# Patient Record
Sex: MALE | Race: WHITE | NOT HISPANIC OR LATINO | Employment: OTHER | URBAN - METROPOLITAN AREA
[De-identification: names, ages, dates, MRNs, and addresses within clinical notes are randomized per-mention and may not be internally consistent; named-entity substitution may affect disease eponyms.]

---

## 2023-03-07 ENCOUNTER — DOCUMENTATION (OUTPATIENT)
Dept: PRIMARY CARE | Facility: CLINIC | Age: 63
End: 2023-03-07
Payer: MEDICARE

## 2023-03-09 ENCOUNTER — PATIENT OUTREACH (OUTPATIENT)
Dept: CARE COORDINATION | Facility: CLINIC | Age: 63
End: 2023-03-09
Payer: MEDICARE

## 2023-03-19 DIAGNOSIS — M62.838 MUSCLE SPASM: Primary | ICD-10-CM

## 2023-03-22 RX ORDER — TIZANIDINE 2 MG/1
TABLET ORAL
Qty: 180 TABLET | Refills: 0 | Status: SHIPPED | OUTPATIENT
Start: 2023-03-22 | End: 2023-05-19

## 2023-03-29 ENCOUNTER — PATIENT OUTREACH (OUTPATIENT)
Dept: PRIMARY CARE | Facility: CLINIC | Age: 63
End: 2023-03-29
Payer: MEDICARE

## 2023-03-29 NOTE — PROGRESS NOTES
TCM Follow up call,  Matthieu states he is doing very well, has followed up with ortho. He had no questions or concerns and appreciated the outreach.

## 2023-04-10 ENCOUNTER — DOCUMENTATION (OUTPATIENT)
Dept: PRIMARY CARE | Facility: CLINIC | Age: 63
End: 2023-04-10
Payer: MEDICARE

## 2023-04-22 DIAGNOSIS — R55 NEUROCARDIOGENIC PRE-SYNCOPE: Primary | ICD-10-CM

## 2023-04-24 RX ORDER — TOPIRAMATE 50 MG/1
TABLET, FILM COATED ORAL
Qty: 180 TABLET | Refills: 3 | Status: SHIPPED | OUTPATIENT
Start: 2023-04-24 | End: 2024-05-30

## 2023-05-18 DIAGNOSIS — M62.838 MUSCLE SPASM: ICD-10-CM

## 2023-05-19 RX ORDER — TIZANIDINE 2 MG/1
TABLET ORAL
Qty: 180 TABLET | Refills: 0 | Status: SHIPPED | OUTPATIENT
Start: 2023-05-19 | End: 2023-07-17

## 2023-07-05 DIAGNOSIS — I25.10 CORONARY ARTERY DISEASE, UNSPECIFIED VESSEL OR LESION TYPE, UNSPECIFIED WHETHER ANGINA PRESENT, UNSPECIFIED WHETHER NATIVE OR TRANSPLANTED HEART: ICD-10-CM

## 2023-07-06 RX ORDER — ATORVASTATIN CALCIUM 80 MG/1
80 TABLET, FILM COATED ORAL DAILY
Qty: 90 TABLET | Refills: 0 | Status: SHIPPED | OUTPATIENT
Start: 2023-07-06 | End: 2023-12-06

## 2023-07-14 DIAGNOSIS — M62.838 MUSCLE SPASM: ICD-10-CM

## 2023-07-17 RX ORDER — TIZANIDINE 2 MG/1
TABLET ORAL
Qty: 180 TABLET | Refills: 0 | Status: SHIPPED | OUTPATIENT
Start: 2023-07-17 | End: 2023-09-15

## 2023-09-13 DIAGNOSIS — M62.838 MUSCLE SPASM: ICD-10-CM

## 2023-09-15 RX ORDER — TIZANIDINE 2 MG/1
TABLET ORAL
Qty: 180 TABLET | Refills: 0 | Status: SHIPPED | OUTPATIENT
Start: 2023-09-15 | End: 2023-11-08

## 2023-10-23 DIAGNOSIS — R32 URINARY INCONTINENCE, UNSPECIFIED TYPE: ICD-10-CM

## 2023-10-23 DIAGNOSIS — R39.15 URINARY URGENCY: ICD-10-CM

## 2023-10-23 DIAGNOSIS — R35.0 URINARY FREQUENCY: ICD-10-CM

## 2023-10-23 RX ORDER — OXYBUTYNIN CHLORIDE 5 MG/1
5 TABLET ORAL 2 TIMES DAILY
Qty: 180 TABLET | Refills: 1 | Status: SHIPPED | OUTPATIENT
Start: 2023-10-23 | End: 2024-02-02

## 2023-11-01 ENCOUNTER — TELEPHONE (OUTPATIENT)
Dept: PRIMARY CARE | Facility: CLINIC | Age: 63
End: 2023-11-01
Payer: MEDICARE

## 2023-11-08 DIAGNOSIS — M62.838 MUSCLE SPASM: ICD-10-CM

## 2023-11-08 RX ORDER — TIZANIDINE 2 MG/1
TABLET ORAL
Qty: 180 TABLET | Refills: 0 | Status: SHIPPED | OUTPATIENT
Start: 2023-11-08 | End: 2023-12-04

## 2023-12-04 DIAGNOSIS — M62.838 MUSCLE SPASM: ICD-10-CM

## 2023-12-04 RX ORDER — TIZANIDINE 2 MG/1
TABLET ORAL
Qty: 60 TABLET | Refills: 0 | Status: SHIPPED | OUTPATIENT
Start: 2023-12-04

## 2023-12-06 DIAGNOSIS — I25.10 CORONARY ARTERY DISEASE, UNSPECIFIED VESSEL OR LESION TYPE, UNSPECIFIED WHETHER ANGINA PRESENT, UNSPECIFIED WHETHER NATIVE OR TRANSPLANTED HEART: ICD-10-CM

## 2023-12-06 RX ORDER — ATORVASTATIN CALCIUM 80 MG/1
80 TABLET, FILM COATED ORAL DAILY
Qty: 90 TABLET | Refills: 0 | Status: SHIPPED | OUTPATIENT
Start: 2023-12-06 | End: 2024-02-19

## 2024-01-04 ENCOUNTER — TELEPHONE (OUTPATIENT)
Dept: PRIMARY CARE | Facility: CLINIC | Age: 64
End: 2024-01-04
Payer: MEDICARE

## 2024-01-09 ENCOUNTER — APPOINTMENT (OUTPATIENT)
Dept: PRIMARY CARE | Facility: CLINIC | Age: 64
End: 2024-01-09
Payer: MEDICARE

## 2024-01-29 ENCOUNTER — PATIENT OUTREACH (OUTPATIENT)
Dept: PRIMARY CARE | Facility: CLINIC | Age: 64
End: 2024-01-29
Payer: MEDICARE

## 2024-01-29 NOTE — PROGRESS NOTES
Discharge Facility:[facility]  Discharge Diagnosis:Altered Mental status, Hypokalemia   Admission Date:1/7/24  Discharge Date: 1/13/24    PCP Appointment Date:2/2/24  Specialist Appointment Date: N/A  Hospital Encounter and Summary: Linked   See discharge assessment below for further details  Engagement  Call Start Time: 1525 (1/29/2024  3:24 PM)    Medications  Medications reviewed with patient/caregiver?: Yes (1/29/2024  3:24 PM)  Is the patient having any side effects they believe may be caused by any medication additions or changes?: No (1/29/2024  3:24 PM)  Does the patient have all medications ordered at discharge?: Yes (1/29/2024  3:24 PM)  Prescription Comments: see med list (1/29/2024  3:24 PM)  Is the patient taking all medications as directed (includes completed medication regime)?: Yes (1/29/2024  3:24 PM)  Medication Comments: see med lis tpotassium chloride CR (Klor-Con M20) 20 MEQ ER tablet    01/14/2024    calcium carbonate (Tums Ultra) 1000 MG chewable tablet   01/13/2024    QUEtiapine (SEROquel) 25 MG tablet    0 01/13/2024    mirabegron ER (Myrbetriq) 25 MG 24 hr tablet   Take 1 tablet (25 mg) by mouth daily. Do not crush, chew, or split.   0 01/13/2024    melatonin 10 MG tablet   .   0 01/13/2024    gabapentin (Neurontin) 100 MG capsule   Take 3 capsules (300 mg) by mouth daily AND 4 capsules (400 mg) Nightly. Do all this for 3 days. The two doses are given at 1400 (2 pm) and before bed..  0 01/13/2024 01/16/2024  droxidopa (Northera) 100 MG capsule   Take 5 capsules (500 mg) before each meal for a total of 1500 mg/24 hours.   0 01/13/2024    carbidopa-levodopa (Sinemet)  MG tablet  .   0 01/13/2024    amitriptyline (Elavil) 50 MG tablet (1/29/2024  3:24 PM)    Appointments  Does the patient have a primary care provider?: Yes (1/29/2024  3:24 PM)  Care Management Interventions: Verified appointment date/time/provider ([facility] 2/2/24) (1/29/2024  3:24 PM)  Has the patient kept scheduled appointments  due by today?: Yes (1/29/2024  3:24 PM)  Care Management Interventions: Advised patient to keep appointment (1/29/2024  3:24 PM)    Patient Teaching  Does the patient have access to their discharge instructions?: Yes (1/29/2024  3:24 PM)  Care Management Interventions: Reviewed instructions with patient (1/29/2024  3:24 PM)  What is the patient's perception of their health status since discharge?: Improving (1/29/2024  3:24 PM)  Is the patient/caregiver able to teach back the hierarchy of who to call/visit for symptoms/problems? PCP, Specialist, Home Health nurse, Urgent Care, ED, 911: Yes (1/29/2024  3:24 PM)      Sarah Ness LPN

## 2024-01-31 ENCOUNTER — PATIENT OUTREACH (OUTPATIENT)
Dept: PRIMARY CARE | Facility: CLINIC | Age: 64
End: 2024-01-31
Payer: MEDICARE

## 2024-02-01 ENCOUNTER — TELEPHONE (OUTPATIENT)
Dept: PRIMARY CARE | Facility: CLINIC | Age: 64
End: 2024-02-01
Payer: MEDICARE

## 2024-02-01 DIAGNOSIS — R26.2 DIFFICULTY WALKING: ICD-10-CM

## 2024-02-01 DIAGNOSIS — R53.1 WEAKNESS GENERALIZED: ICD-10-CM

## 2024-02-01 DIAGNOSIS — R29.6 FALLING: Primary | ICD-10-CM

## 2024-02-01 PROBLEM — R55 NEUROCARDIOGENIC PRE-SYNCOPE: Status: ACTIVE | Noted: 2024-02-01

## 2024-02-01 PROBLEM — Z98.61 HISTORY OF PTCA: Status: ACTIVE | Noted: 2017-01-09

## 2024-02-01 PROBLEM — E27.40 ADRENAL CORTICAL INSUFFICIENCY (MULTI): Status: ACTIVE | Noted: 2023-03-28

## 2024-02-01 PROBLEM — N40.0 BENIGN PROSTATIC HYPERPLASIA: Status: ACTIVE | Noted: 2023-01-12

## 2024-02-01 PROBLEM — E78.5 HYPERLIPIDEMIA: Status: ACTIVE | Noted: 2018-07-14

## 2024-02-01 PROBLEM — G31.84 MCI (MILD COGNITIVE IMPAIRMENT): Status: ACTIVE | Noted: 2020-07-14

## 2024-02-01 PROBLEM — I25.10 CAD (CORONARY ARTERY DISEASE): Status: ACTIVE | Noted: 2024-02-01

## 2024-02-01 PROBLEM — I74.3: Status: ACTIVE | Noted: 2021-02-25

## 2024-02-01 PROBLEM — F32.A DEPRESSION: Status: ACTIVE | Noted: 2023-03-28

## 2024-02-01 PROBLEM — M48.062 SPINAL STENOSIS OF LUMBAR REGION WITH NEUROGENIC CLAUDICATION: Status: ACTIVE | Noted: 2024-02-01

## 2024-02-01 PROBLEM — G62.1 ALCOHOLIC POLYNEUROPATHY (MULTI): Status: ACTIVE | Noted: 2023-03-28

## 2024-02-02 ENCOUNTER — TELEMEDICINE (OUTPATIENT)
Dept: PRIMARY CARE | Facility: CLINIC | Age: 64
End: 2024-02-02
Payer: MEDICARE

## 2024-02-02 DIAGNOSIS — G90.3 NEUROGENIC ORTHOSTATIC HYPOTENSION (MULTI): ICD-10-CM

## 2024-02-02 DIAGNOSIS — F32.A ANXIETY AND DEPRESSION: ICD-10-CM

## 2024-02-02 DIAGNOSIS — F33.41 MAJOR DEPRESSIVE DISORDER, RECURRENT, IN PARTIAL REMISSION (CMS-HCC): ICD-10-CM

## 2024-02-02 DIAGNOSIS — N32.81 OVERACTIVE BLADDER: ICD-10-CM

## 2024-02-02 DIAGNOSIS — F33.1 MAJOR DEPRESSIVE DISORDER, RECURRENT, MODERATE (MULTI): ICD-10-CM

## 2024-02-02 DIAGNOSIS — Z11.59 NEED FOR HEPATITIS C SCREENING TEST: ICD-10-CM

## 2024-02-02 DIAGNOSIS — E87.6 HYPOKALEMIA: ICD-10-CM

## 2024-02-02 DIAGNOSIS — K21.9 GASTROESOPHAGEAL REFLUX DISEASE WITHOUT ESOPHAGITIS: ICD-10-CM

## 2024-02-02 DIAGNOSIS — I74.3: ICD-10-CM

## 2024-02-02 DIAGNOSIS — R74.8 ELEVATED LIVER ENZYMES: Primary | ICD-10-CM

## 2024-02-02 DIAGNOSIS — R26.81 GAIT INSTABILITY: ICD-10-CM

## 2024-02-02 DIAGNOSIS — F41.9 ANXIETY AND DEPRESSION: ICD-10-CM

## 2024-02-02 DIAGNOSIS — G62.1 ALCOHOLIC POLYNEUROPATHY (MULTI): ICD-10-CM

## 2024-02-02 DIAGNOSIS — M87.00 IDIOPATHIC ASEPTIC NECROSIS OF UNSPECIFIED BONE (MULTI): ICD-10-CM

## 2024-02-02 DIAGNOSIS — E27.40: ICD-10-CM

## 2024-02-02 DIAGNOSIS — G31.84 MCI (MILD COGNITIVE IMPAIRMENT): ICD-10-CM

## 2024-02-02 PROCEDURE — 99214 OFFICE O/P EST MOD 30 MIN: CPT | Performed by: INTERNAL MEDICINE

## 2024-02-02 RX ORDER — CLONAZEPAM 1 MG/1
3 TABLET ORAL NIGHTLY
COMMUNITY
Start: 2023-10-19

## 2024-02-02 RX ORDER — OMEPRAZOLE 20 MG/1
20 TABLET, DELAYED RELEASE ORAL
Qty: 30 TABLET | Refills: 11 | COMMUNITY
Start: 2024-02-02 | End: 2024-02-06 | Stop reason: SDUPTHER

## 2024-02-02 RX ORDER — AMITRIPTYLINE HYDROCHLORIDE 50 MG/1
50 TABLET, FILM COATED ORAL NIGHTLY
Qty: 30 TABLET | Refills: 11 | COMMUNITY
Start: 2024-02-02 | End: 2024-02-09 | Stop reason: SDUPTHER

## 2024-02-02 RX ORDER — GABAPENTIN 300 MG/1
300 CAPSULE ORAL 2 TIMES DAILY
COMMUNITY

## 2024-02-02 RX ORDER — SERTRALINE HYDROCHLORIDE 100 MG/1
100 TABLET, FILM COATED ORAL DAILY
Qty: 30 TABLET | Refills: 5 | COMMUNITY
Start: 2024-02-02

## 2024-02-02 RX ORDER — RIVASTIGMINE 9.5 MG/24H
1 PATCH, EXTENDED RELEASE TRANSDERMAL DAILY
Qty: 30 PATCH | Refills: 11 | COMMUNITY
Start: 2024-02-02

## 2024-02-02 RX ORDER — TROSPIUM CHLORIDE 20 MG/1
20 TABLET, FILM COATED ORAL 2 TIMES DAILY
Qty: 60 TABLET | Refills: 11 | COMMUNITY
Start: 2024-02-02 | End: 2024-02-09 | Stop reason: SDUPTHER

## 2024-02-02 RX ORDER — DROXIDOPA 100 MG/1
500 CAPSULE ORAL 3 TIMES DAILY
COMMUNITY
Start: 2024-01-13

## 2024-02-02 RX ORDER — CARBIDOPA AND LEVODOPA 50; 200 MG/1; MG/1
1 TABLET, EXTENDED RELEASE ORAL NIGHTLY
COMMUNITY
Start: 2023-09-27

## 2024-02-02 RX ORDER — FLUDROCORTISONE ACETATE 0.1 MG/1
0.1 TABLET ORAL DAILY
COMMUNITY
Start: 2022-09-13

## 2024-02-02 RX ORDER — POTASSIUM CHLORIDE 20 MEQ/1
20 TABLET, EXTENDED RELEASE ORAL DAILY
Qty: 30 TABLET | Refills: 11 | COMMUNITY
Start: 2024-02-02 | End: 2024-02-09 | Stop reason: SDUPTHER

## 2024-02-02 RX ORDER — FERROUS SULFATE 325(65) MG
325 TABLET ORAL
COMMUNITY

## 2024-02-02 NOTE — PROGRESS NOTES
Subjective   Patient ID: Matthieu Birch is a 63 y.o. male who presents for Hospital Follow-up (Video visit for hospital visit ).    HPI   Hospital discharge follow up, improving gradually. Requesting disability placard.    Recovering from an acute COVID infection. Home health care is scheduled for the near future assistance.      Assessment/Plan   Diagnoses and all orders for this visit:  Elevated liver enzymes  -     Hepatic function panel; Future  Need for hepatitis C screening test  -     Hepatitis C Antibody; Future  MCI (mild cognitive impairment)  Adrenal cortical insufficiency (CMS/HCC)  Hypokalemia  Anxiety and depression  Gastroesophageal reflux disease without esophagitis  Overactive bladder  Gait instability  -     [facility] records from 1/29-1/31 reviewed and discussed with the patient and his wife.

## 2024-02-05 DIAGNOSIS — F32.A ANXIETY AND DEPRESSION: ICD-10-CM

## 2024-02-05 DIAGNOSIS — N32.81 OVERACTIVE BLADDER: ICD-10-CM

## 2024-02-05 DIAGNOSIS — F41.9 ANXIETY AND DEPRESSION: ICD-10-CM

## 2024-02-05 DIAGNOSIS — K21.9 GASTROESOPHAGEAL REFLUX DISEASE WITHOUT ESOPHAGITIS: ICD-10-CM

## 2024-02-06 DIAGNOSIS — K21.9 GASTROESOPHAGEAL REFLUX DISEASE WITHOUT ESOPHAGITIS: ICD-10-CM

## 2024-02-06 RX ORDER — OMEPRAZOLE 20 MG/1
20 CAPSULE, DELAYED RELEASE ORAL EVERY MORNING
Refills: 0 | OUTPATIENT
Start: 2024-02-06

## 2024-02-06 RX ORDER — TROSPIUM CHLORIDE 20 MG/1
20 TABLET, FILM COATED ORAL 2 TIMES DAILY
Qty: 180 TABLET | Refills: 0 | OUTPATIENT
Start: 2024-02-06

## 2024-02-06 RX ORDER — AMITRIPTYLINE HYDROCHLORIDE 50 MG/1
50 TABLET, FILM COATED ORAL NIGHTLY
Qty: 90 TABLET | Refills: 0 | OUTPATIENT
Start: 2024-02-06

## 2024-02-06 RX ORDER — OMEPRAZOLE 20 MG/1
20 TABLET, DELAYED RELEASE ORAL
Qty: 30 TABLET | Refills: 11 | Status: SHIPPED | OUTPATIENT
Start: 2024-02-06 | End: 2024-02-09 | Stop reason: SDUPTHER

## 2024-02-09 DIAGNOSIS — K21.9 GASTROESOPHAGEAL REFLUX DISEASE WITHOUT ESOPHAGITIS: ICD-10-CM

## 2024-02-09 DIAGNOSIS — N32.81 OVERACTIVE BLADDER: ICD-10-CM

## 2024-02-09 DIAGNOSIS — E87.6 HYPOKALEMIA: ICD-10-CM

## 2024-02-09 DIAGNOSIS — F41.9 ANXIETY AND DEPRESSION: ICD-10-CM

## 2024-02-09 DIAGNOSIS — F32.A ANXIETY AND DEPRESSION: ICD-10-CM

## 2024-02-09 RX ORDER — AMITRIPTYLINE HYDROCHLORIDE 50 MG/1
50 TABLET, FILM COATED ORAL NIGHTLY
Qty: 90 TABLET | Refills: 3 | Status: SHIPPED | OUTPATIENT
Start: 2024-02-09

## 2024-02-09 RX ORDER — TROSPIUM CHLORIDE 20 MG/1
20 TABLET, FILM COATED ORAL 2 TIMES DAILY
Qty: 180 TABLET | Refills: 0 | Status: SHIPPED | OUTPATIENT
Start: 2024-02-09 | End: 2024-02-19

## 2024-02-09 RX ORDER — POTASSIUM CHLORIDE 20 MEQ/1
20 TABLET, EXTENDED RELEASE ORAL DAILY
Qty: 90 TABLET | Refills: 3 | Status: SHIPPED | OUTPATIENT
Start: 2024-02-09

## 2024-02-09 RX ORDER — TROSPIUM CHLORIDE 20 MG/1
20 TABLET, FILM COATED ORAL 2 TIMES DAILY
Qty: 60 TABLET | Refills: 5 | Status: SHIPPED | OUTPATIENT
Start: 2024-02-09 | End: 2024-02-09 | Stop reason: SDUPTHER

## 2024-02-09 RX ORDER — OMEPRAZOLE 20 MG/1
20 TABLET, DELAYED RELEASE ORAL
Qty: 90 TABLET | Refills: 3 | Status: SHIPPED | OUTPATIENT
Start: 2024-02-09

## 2024-02-14 ENCOUNTER — APPOINTMENT (OUTPATIENT)
Dept: UROLOGY | Facility: CLINIC | Age: 64
End: 2024-02-14
Payer: MEDICARE

## 2024-02-16 DIAGNOSIS — N32.81 OVERACTIVE BLADDER: ICD-10-CM

## 2024-02-18 DIAGNOSIS — I25.10 CORONARY ARTERY DISEASE, UNSPECIFIED VESSEL OR LESION TYPE, UNSPECIFIED WHETHER ANGINA PRESENT, UNSPECIFIED WHETHER NATIVE OR TRANSPLANTED HEART: ICD-10-CM

## 2024-02-19 RX ORDER — TROSPIUM CHLORIDE 20 MG/1
20 TABLET, FILM COATED ORAL 2 TIMES DAILY
Qty: 180 TABLET | Refills: 0 | Status: SHIPPED | OUTPATIENT
Start: 2024-02-19

## 2024-02-19 RX ORDER — ATORVASTATIN CALCIUM 80 MG/1
80 TABLET, FILM COATED ORAL DAILY
Qty: 90 TABLET | Refills: 0 | Status: SHIPPED | OUTPATIENT
Start: 2024-02-19 | End: 2024-05-02

## 2024-02-21 ENCOUNTER — PATIENT OUTREACH (OUTPATIENT)
Dept: PRIMARY CARE | Facility: CLINIC | Age: 64
End: 2024-02-21
Payer: MEDICARE

## 2024-02-27 ENCOUNTER — APPOINTMENT (OUTPATIENT)
Dept: UROLOGY | Facility: CLINIC | Age: 64
End: 2024-02-27
Payer: MEDICARE

## 2024-03-27 ENCOUNTER — APPOINTMENT (OUTPATIENT)
Dept: UROLOGY | Facility: CLINIC | Age: 64
End: 2024-03-27
Payer: MEDICARE

## 2024-03-28 ENCOUNTER — PATIENT OUTREACH (OUTPATIENT)
Dept: PRIMARY CARE | Facility: CLINIC | Age: 64
End: 2024-03-28
Payer: MEDICARE

## 2024-04-29 ENCOUNTER — PATIENT OUTREACH (OUTPATIENT)
Dept: PRIMARY CARE | Facility: CLINIC | Age: 64
End: 2024-04-29
Payer: MEDICARE

## 2024-04-29 NOTE — PROGRESS NOTES
Final call.  CM called and spoke with pt to address any final questions or concerns regarding hospitalization.  Pt reports doing well and has no concerns at this time.  Pt given my contact info  in the event questions should arise.

## 2024-05-02 DIAGNOSIS — I25.10 CORONARY ARTERY DISEASE, UNSPECIFIED VESSEL OR LESION TYPE, UNSPECIFIED WHETHER ANGINA PRESENT, UNSPECIFIED WHETHER NATIVE OR TRANSPLANTED HEART: ICD-10-CM

## 2024-05-02 RX ORDER — ATORVASTATIN CALCIUM 80 MG/1
80 TABLET, FILM COATED ORAL DAILY
Qty: 90 TABLET | Refills: 0 | Status: SHIPPED | OUTPATIENT
Start: 2024-05-02

## 2024-05-06 DIAGNOSIS — N32.81 OVERACTIVE BLADDER: Primary | ICD-10-CM

## 2024-05-06 RX ORDER — OXYBUTYNIN CHLORIDE 5 MG/1
5 TABLET ORAL 2 TIMES DAILY
Qty: 180 TABLET | Refills: 0 | Status: SHIPPED | OUTPATIENT
Start: 2024-05-06

## 2024-05-07 ENCOUNTER — APPOINTMENT (OUTPATIENT)
Dept: UROLOGY | Facility: CLINIC | Age: 64
End: 2024-05-07
Payer: MEDICARE

## 2024-05-27 DIAGNOSIS — R55 NEUROCARDIOGENIC PRE-SYNCOPE: ICD-10-CM

## 2024-05-29 ENCOUNTER — APPOINTMENT (OUTPATIENT)
Dept: UROLOGY | Facility: CLINIC | Age: 64
End: 2024-05-29
Payer: MEDICARE

## 2024-05-30 RX ORDER — TOPIRAMATE 50 MG/1
50 TABLET, FILM COATED ORAL 2 TIMES DAILY
Qty: 180 TABLET | Refills: 0 | Status: SHIPPED | OUTPATIENT
Start: 2024-05-30

## 2024-06-15 DIAGNOSIS — R55 NEUROCARDIOGENIC PRE-SYNCOPE: ICD-10-CM

## 2024-06-17 DIAGNOSIS — N32.81 OVERACTIVE BLADDER: ICD-10-CM

## 2024-06-17 RX ORDER — OXYBUTYNIN CHLORIDE 5 MG/1
5 TABLET ORAL 3 TIMES DAILY
Qty: 90 TABLET | Refills: 0 | Status: SHIPPED | OUTPATIENT
Start: 2024-06-17

## 2024-06-17 RX ORDER — TOPIRAMATE 50 MG/1
50 TABLET, FILM COATED ORAL 2 TIMES DAILY
Qty: 180 TABLET | Refills: 0 | Status: SHIPPED | OUTPATIENT
Start: 2024-06-17

## 2024-06-25 ENCOUNTER — APPOINTMENT (OUTPATIENT)
Dept: UROLOGY | Facility: CLINIC | Age: 64
End: 2024-06-25
Payer: MEDICARE

## 2024-07-15 DIAGNOSIS — I25.10 CORONARY ARTERY DISEASE, UNSPECIFIED VESSEL OR LESION TYPE, UNSPECIFIED WHETHER ANGINA PRESENT, UNSPECIFIED WHETHER NATIVE OR TRANSPLANTED HEART: ICD-10-CM

## 2024-07-17 ENCOUNTER — APPOINTMENT (OUTPATIENT)
Dept: UROLOGY | Facility: CLINIC | Age: 64
End: 2024-07-17
Payer: MEDICARE

## 2024-07-17 VITALS
HEIGHT: 75 IN | HEART RATE: 73 BPM | DIASTOLIC BLOOD PRESSURE: 89 MMHG | SYSTOLIC BLOOD PRESSURE: 150 MMHG | WEIGHT: 204 LBS | BODY MASS INDEX: 25.36 KG/M2

## 2024-07-17 DIAGNOSIS — R35.0 BENIGN PROSTATIC HYPERPLASIA WITH URINARY FREQUENCY: Primary | ICD-10-CM

## 2024-07-17 DIAGNOSIS — N40.1 BENIGN PROSTATIC HYPERPLASIA WITH URINARY FREQUENCY: Primary | ICD-10-CM

## 2024-07-17 DIAGNOSIS — R32 URINARY INCONTINENCE, UNSPECIFIED TYPE: ICD-10-CM

## 2024-07-17 PROBLEM — G45.9 TIA (TRANSIENT ISCHEMIC ATTACK): Status: ACTIVE | Noted: 2023-01-12

## 2024-07-17 PROBLEM — N39.0 UTI (URINARY TRACT INFECTION): Status: ACTIVE | Noted: 2024-07-17

## 2024-07-17 PROBLEM — R39.15 URINARY URGENCY: Status: ACTIVE | Noted: 2024-07-17

## 2024-07-17 LAB
POC APPEARANCE, URINE: ABNORMAL
POC BILIRUBIN, URINE: NEGATIVE
POC BLOOD, URINE: NEGATIVE
POC COLOR, URINE: YELLOW
POC GLUCOSE, URINE: NEGATIVE MG/DL
POC KETONES, URINE: NEGATIVE MG/DL
POC LEUKOCYTES, URINE: NEGATIVE
POC NITRITE,URINE: NEGATIVE
POC PH, URINE: 7.5 PH
POC PROTEIN, URINE: NEGATIVE MG/DL
POC SPECIFIC GRAVITY, URINE: 1.02
POC UROBILINOGEN, URINE: 0.2 EU/DL

## 2024-07-17 PROCEDURE — 99204 OFFICE O/P NEW MOD 45 MIN: CPT | Performed by: NURSE PRACTITIONER

## 2024-07-17 PROCEDURE — 51798 US URINE CAPACITY MEASURE: CPT | Performed by: NURSE PRACTITIONER

## 2024-07-17 PROCEDURE — 81003 URINALYSIS AUTO W/O SCOPE: CPT | Performed by: NURSE PRACTITIONER

## 2024-07-17 PROCEDURE — 3008F BODY MASS INDEX DOCD: CPT | Performed by: NURSE PRACTITIONER

## 2024-07-17 RX ORDER — TAMSULOSIN HYDROCHLORIDE 0.4 MG/1
0.4 CAPSULE ORAL DAILY
Qty: 30 CAPSULE | Refills: 2 | Status: SHIPPED | OUTPATIENT
Start: 2024-07-17 | End: 2024-10-15

## 2024-07-17 NOTE — PROGRESS NOTES
UROLOGIC INITIAL EVALUATION     PROBLEM LIST:  1. Benign prostatic hyperplasia with urinary frequency  tamsulosin (Flomax) 0.4 mg 24 hr capsule      2. Urinary incontinence, unspecified type  Post-Void Residual    POCT UA Automated manually resulted           HISTORY OF PRESENT ILLNESS:   Matthieu Birch is a 63 y.o. with CAD, BPH, Parkinson's, spinal stenosis  Presents today for evaluation and management of urinary urgency  Seen accompanied by spouse, Marisa  Reports urgency, frequency despite oxybutynin  Worst when going from lying to sitting/standing  Issues began following a series of strokes about 5 years ago  Sometimes voiding as often as q 30 minutes  Can postpone voiding as long as he doesn't change position  Notes normal volume when voiding  Weak stream at first then normal  Had UTI then broke his hip last fall, symptoms worse since then   Also had incontinence at the time, resolved     PSH includes multiple craniotomies, multiple back/neck surgeries [id number], spinal cord stimulator placed 2023    PAST MEDICAL HISTORY:  Past Medical History:   Diagnosis Date    Encounter for screening for malignant neoplasm of prostate 02/01/2019    Screening PSA (prostate specific antigen)    Epidural hemorrhage with loss of consciousness status unknown, initial encounter (Multi) 08/20/2018    Epidural hematoma    Old myocardial infarction     History of myocardial infarction    Personal history of other diseases of male genital organs 01/04/2021    History of benign prostatic hyperplasia    Personal history of other diseases of the circulatory system     History of cardiac disorder    Personal history of other diseases of the circulatory system 06/13/2018    History of subdural hematoma    Personal history of other diseases of the circulatory system     History of subdural hematoma    Personal history of other diseases of the circulatory system     History of cardiovascular disorder    Personal history of other  diseases of the musculoskeletal system and connective tissue     History of arthritis    Personal history of transient ischemic attack (TIA), and cerebral infarction without residual deficits     History of cerebrovascular accident    Traumatic subdural hemorrhage with loss of consciousness status unknown, initial encounter (Multi) 05/18/2018    Subdural hematoma, post-traumatic       PAST SURGICAL HISTORY:  Past Surgical History:   Procedure Laterality Date    ANKLE SURGERY  06/18/2018    Ankle Surgery    BACK SURGERY  05/05/2022    Back Surgery    MR HEAD ANGIO WO IV CONTRAST  1/28/2015    MR HEAD ANGIO WO IV CONTRAST 1/28/2015 [facility]    MR NECK ANGIO WO IV CONTRAST  1/28/2015    MR NECK ANGIO WO IV CONTRAST 1/28/2015 [facility]    NECK SURGERY  07/11/2017    Neck Surgery    OTHER SURGICAL HISTORY  03/26/2018    Craniotomy (Therapeutic)        ALLERGIES:   Allergies   Allergen Reactions    Oxycodone Confusion, Hallucinations and Psychosis    Oxycodone-Acetaminophen Hallucinations        MEDICATIONS:   Current Outpatient Medications on File Prior to Visit   Medication Sig Dispense Refill    amitriptyline (Elavil) 50 mg tablet Take 1 tablet (50 mg) by mouth once daily at bedtime. 90 tablet 3    aspirin 81 mg capsule Take 81 mg by mouth once every 24 hours.      atorvastatin (Lipitor) 80 mg tablet Take 1 tablet (80 mg) by mouth once daily. ---DUE FOR APPT AND LABS 90 tablet 0    carbidopa-levodopa (Sinemet CR)  mg ER tablet Take 1 tablet by mouth once daily at bedtime.      clonazePAM (KlonoPIN) 1 mg tablet Take 3 tablets (3 mg) by mouth once daily at bedtime.      droxidopa (Northera) 100 mg capsule Take 5 capsules (500 mg) by mouth 3 times a day.      ferrous sulfate, 325 mg ferrous sulfate, tablet Take 1 tablet by mouth once daily.      fludrocortisone (Florinef) 0.1 mg tablet Take 1 tablet (0.1 mg) by mouth once daily.      gabapentin (Neurontin) 300 mg capsule Take 1 capsule (300  mg) by mouth 2 times a day.      omeprazole OTC (PriLOSEC OTC) 20 mg EC tablet Take 1 tablet (20 mg) by mouth once daily in the morning. Take before meals. Do not crush, chew, or split. 90 tablet 3    oxybutynin (Ditropan) 5 mg tablet Take 1 tablet (5 mg) by mouth 3 times a day. 90 tablet 0    potassium chloride CR (Klor-Con M20) 20 mEq ER tablet Take 1 tablet (20 mEq) by mouth once daily. Do not crush or chew. 90 tablet 3    rivastigmine (Exelon Patch) 9.5 mg/24 hour Place 1 patch on the skin once daily. 30 patch 11    sertraline (Zoloft) 100 mg tablet Take 1 tablet (100 mg) by mouth once daily. 30 tablet 5    tiZANidine (Zanaflex) 2 mg tablet TAKE 1 TO 2 TABLETS EVERY 8 HOURS AS NEEDED 60 tablet 0    topiramate (Topamax) 50 mg tablet Take 1 tablet (50 mg) by mouth 2 times a day. 180 tablet 0    trospium (Sanctura) 20 mg tablet TAKE 1 TABLET TWICE DAILY 180 tablet 0     No current facility-administered medications on file prior to visit.        SOCIAL HISTORY:  Patient  reports that he has never smoked. He has never used smokeless tobacco.   Social History     Socioeconomic History    Marital status:      Spouse name: Not on file    Number of children: Not on file    Years of education: Not on file    Highest education level: Not on file   Occupational History    Not on file   Tobacco Use    Smoking status: Never    Smokeless tobacco: Never   Substance and Sexual Activity    Alcohol use: Not on file    Drug use: Not on file    Sexual activity: Not on file   Other Topics Concern    Not on file   Social History Narrative    Not on file     Social Determinants of Health     Financial Resource Strain: Not on file   Food Insecurity: Not on file   Transportation Needs: No Transportation Needs (1/8/2024)    Received from [facility] - [facility]     Lack of Transportation (Medical): No     Lack of Transportation (Non-Medical): No   Physical Activity: Not on file   Stress: Not on file    Social Connections: Not on file   Intimate Partner Violence: Not on file   Housing Stability: Low Risk  (1/8/2024)    [facility] [employer] [employer]    Housing Stability Vital Sign     Unable to Pay for Housing in the Last Year: No     Number of Places Lived in the Last Year: 1     Unstable Housing in the Last Year: No       FAMILY HISTORY:  No family history on file.    REVIEW OF SYSTEMS:  All systems reviewed, pertinent negatives as noted in HPI.     PHYSICAL EXAM:  Visit Vitals  /89   Pulse 73     Constitutional: Well-developed and well-nourished. No distress.    Head: Normocephalic and atraumatic.    Neck: Normal range of motion.     Pulmonary/Chest: Effort normal. No respiratory distress.   Abdominal: Non-distended.  : See below.  Integumentary: No rash or lesions visualized.  Musculoskeletal: Normal range of motion.    Neurological: Alert and oriented.  Psychiatric: Normal mood and affect. Thought content normal.      LABORATORY REVIEW:   Lab Results   Component Value Date    BUN 20 02/17/2023    CREATININE 1.27 02/17/2023     02/17/2023    K 4.0 02/17/2023     (H) 02/17/2023    CO2 29 02/17/2023    OSMOLALITY 325 (H) 03/09/2018    CALCIUM 8.5 (L) 02/17/2023      Lab Results   Component Value Date    WBC 4.6 02/17/2023    RBC 4.26 (L) 02/17/2023    HGB 13.2 (L) 02/17/2023    HCT 42.2 02/17/2023    MCV 99 02/17/2023    MCHC 31.3 (L) 02/17/2023    RDW 13.2 02/17/2023     02/17/2023        Lab Results   Component Value Date    PSA 0.19 08/26/2022        Urine dipstick shows negative for all components.        Assessment:      1. Benign prostatic hyperplasia with urinary frequency  tamsulosin (Flomax) 0.4 mg 24 hr capsule      2. Urinary incontinence, unspecified type  Post-Void Residual    POCT UA Automated manually resulted          Matthieu Birch is a 63 y.o. with BPH and associated urgency, frequency  Significant urinary retention on bladder scan,   IPSS 13, BETH  16  UA negative    Discussed natural hx of BPH and approach to management, including medication & surgical intervention  Reviewed options for management of retention, including CIC  Suspect degree of chronic bladder distention, possibly related in part to stroke, spinal issues, medication     Plan:   Stop oxybutynin  Start tamsulosin, reviewed potential adverse effects  RTC in 4 weeks for reassessment with PVR

## 2024-07-21 RX ORDER — ATORVASTATIN CALCIUM 80 MG/1
80 TABLET, FILM COATED ORAL DAILY
Qty: 30 TABLET | Refills: 0 | Status: SHIPPED | OUTPATIENT
Start: 2024-07-21

## 2024-08-12 DIAGNOSIS — I25.10 CORONARY ARTERY DISEASE, UNSPECIFIED VESSEL OR LESION TYPE, UNSPECIFIED WHETHER ANGINA PRESENT, UNSPECIFIED WHETHER NATIVE OR TRANSPLANTED HEART: ICD-10-CM

## 2024-08-12 RX ORDER — ATORVASTATIN CALCIUM 80 MG/1
80 TABLET, FILM COATED ORAL DAILY
Qty: 30 TABLET | Refills: 0 | Status: SHIPPED | OUTPATIENT
Start: 2024-08-12

## 2024-08-20 ENCOUNTER — APPOINTMENT (OUTPATIENT)
Dept: UROLOGY | Facility: CLINIC | Age: 64
End: 2024-08-20
Payer: MEDICARE

## 2024-08-20 VITALS
SYSTOLIC BLOOD PRESSURE: 115 MMHG | DIASTOLIC BLOOD PRESSURE: 74 MMHG | HEART RATE: 75 BPM | TEMPERATURE: 97.9 F | BODY MASS INDEX: 26 KG/M2 | WEIGHT: 208 LBS

## 2024-08-20 DIAGNOSIS — R35.0 BENIGN PROSTATIC HYPERPLASIA WITH URINARY FREQUENCY: ICD-10-CM

## 2024-08-20 DIAGNOSIS — N40.1 BENIGN PROSTATIC HYPERPLASIA WITH URINARY FREQUENCY: ICD-10-CM

## 2024-08-20 DIAGNOSIS — R39.15 URINARY URGENCY: Primary | ICD-10-CM

## 2024-08-20 PROCEDURE — 51798 US URINE CAPACITY MEASURE: CPT | Performed by: NURSE PRACTITIONER

## 2024-08-20 PROCEDURE — 99214 OFFICE O/P EST MOD 30 MIN: CPT | Performed by: NURSE PRACTITIONER

## 2024-08-20 RX ORDER — DROXIDOPA 300 MG/1
300 CAPSULE ORAL 3 TIMES DAILY
COMMUNITY
Start: 2024-08-07

## 2024-08-20 RX ORDER — TAMSULOSIN HYDROCHLORIDE 0.4 MG/1
0.4 CAPSULE ORAL DAILY
Qty: 90 CAPSULE | Refills: 3 | Status: SHIPPED | OUTPATIENT
Start: 2024-08-20 | End: 2025-08-15

## 2024-08-20 RX ORDER — PANTOPRAZOLE SODIUM 40 MG/1
40 TABLET, DELAYED RELEASE ORAL
COMMUNITY
Start: 2024-08-07

## 2024-08-20 NOTE — PROGRESS NOTES
UROLOGIC FOLLOW UP VISIT     PROBLEM LIST:  1. Urinary urgency  Referral to Physical Therapy      2. Benign prostatic hyperplasia with urinary frequency  Post-Void Residual    tamsulosin (Flomax) 0.4 mg 24 hr capsule           HISTORY OF PRESENT ILLNESS:   Matthieu Birch is a 63 y.o. with with BPH and associated urgency, frequency; significant urinary retention  Started on tamsulosin at visit 7/17/24    INTERVAL HISTORY:  Returns today for [facility]  Seen accompanied by spouse  Reports improvement in symptoms  No adverse effects with tamsulosin    PSH multiple craniotomies, multiple back/neck surgeries [id number]  Spinal cord stimulator placed 2023    PAST MEDICAL HISTORY:  Past Medical History:   Diagnosis Date    Encounter for screening for malignant neoplasm of prostate 02/01/2019    Screening PSA (prostate specific antigen)    Epidural hemorrhage with loss of consciousness status unknown, initial encounter (Multi) 08/20/2018    Epidural hematoma    Old myocardial infarction     History of myocardial infarction    Personal history of other diseases of male genital organs 01/04/2021    History of benign prostatic hyperplasia    Personal history of other diseases of the circulatory system     History of cardiac disorder    Personal history of other diseases of the circulatory system 06/13/2018    History of subdural hematoma    Personal history of other diseases of the circulatory system     History of subdural hematoma    Personal history of other diseases of the circulatory system     History of cardiovascular disorder    Personal history of other diseases of the musculoskeletal system and connective tissue     History of arthritis    Personal history of transient ischemic attack (TIA), and cerebral infarction without residual deficits     History of cerebrovascular accident    Traumatic subdural hemorrhage with loss of consciousness status unknown, initial encounter (Multi) 05/18/2018    Subdural hematoma,  post-traumatic       PAST SURGICAL HISTORY:  Past Surgical History:   Procedure Laterality Date    ANKLE SURGERY  06/18/2018    Ankle Surgery    BACK SURGERY  05/05/2022    Back Surgery    MR HEAD ANGIO WO IV CONTRAST  1/28/2015    MR HEAD ANGIO WO IV CONTRAST 1/28/2015 [facility]    MR NECK ANGIO WO IV CONTRAST  1/28/2015    MR NECK ANGIO WO IV CONTRAST 1/28/2015 [facility]    NECK SURGERY  07/11/2017    Neck Surgery    OTHER SURGICAL HISTORY  03/26/2018    Craniotomy (Therapeutic)        ALLERGIES:   Allergies   Allergen Reactions    Oxycodone Confusion, Hallucinations and Psychosis    Oxycodone-Acetaminophen Hallucinations        MEDICATIONS:   Current Outpatient Medications on File Prior to Visit   Medication Sig Dispense Refill    amitriptyline (Elavil) 50 mg tablet Take 1 tablet (50 mg) by mouth once daily at bedtime. 90 tablet 3    aspirin 81 mg capsule Take 81 mg by mouth once every 24 hours.      atorvastatin (Lipitor) 80 mg tablet Take 1 tablet (80 mg) by mouth once daily. ----DUE FOR APPT 30 tablet 0    carbidopa-levodopa (Sinemet CR)  mg ER tablet Take 1 tablet by mouth once daily at bedtime.      clonazePAM (KlonoPIN) 1 mg tablet Take 3 tablets (3 mg) by mouth once daily at bedtime.      droxidopa (Northera) 300 mg capsule Take 1 capsule (300 mg) by mouth 3 times a day.      ferrous sulfate, 325 mg ferrous sulfate, tablet Take 1 tablet by mouth once daily.      fludrocortisone (Florinef) 0.1 mg tablet Take 1 tablet (0.1 mg) by mouth once daily.      gabapentin (Neurontin) 300 mg capsule Take 1 capsule (300 mg) by mouth 2 times a day.      omeprazole OTC (PriLOSEC OTC) 20 mg EC tablet Take 1 tablet (20 mg) by mouth once daily in the morning. Take before meals. Do not crush, chew, or split. 90 tablet 3    oxybutynin (Ditropan) 5 mg tablet Take 1 tablet (5 mg) by mouth 3 times a day. 90 tablet 0    pantoprazole (ProtoNix) 40 mg EC tablet Take 1 tablet (40 mg) by mouth once daily  in the morning. Take before meals.      potassium chloride CR (Klor-Con M20) 20 mEq ER tablet Take 1 tablet (20 mEq) by mouth once daily. Do not crush or chew. 90 tablet 3    rivastigmine (Exelon Patch) 9.5 mg/24 hour Place 1 patch on the skin once daily. 30 patch 11    sertraline (Zoloft) 100 mg tablet Take 1 tablet (100 mg) by mouth once daily. 30 tablet 5    tamsulosin (Flomax) 0.4 mg 24 hr capsule Take 1 capsule (0.4 mg) by mouth once daily. 30 capsule 2    tiZANidine (Zanaflex) 2 mg tablet TAKE 1 TO 2 TABLETS EVERY 8 HOURS AS NEEDED 60 tablet 0    topiramate (Topamax) 50 mg tablet Take 1 tablet (50 mg) by mouth 2 times a day. 180 tablet 0    droxidopa (Northera) 100 mg capsule Take 5 capsules (500 mg) by mouth 3 times a day.       No current facility-administered medications on file prior to visit.        SOCIAL HISTORY:  Patient  reports that he has never smoked. He has never used smokeless tobacco.   Social History     Socioeconomic History    Marital status:      Spouse name: Not on file    Number of children: Not on file    Years of education: Not on file    Highest education level: Not on file   Occupational History    Not on file   Tobacco Use    Smoking status: Never    Smokeless tobacco: Never   Substance and Sexual Activity    Alcohol use: Not on file    Drug use: Not on file    Sexual activity: Not on file   Other Topics Concern    Not on file   Social History Narrative    Not on file     Social Determinants of Health     Financial Resource Strain: Not on file   Food Insecurity: Not on file   Transportation Needs: No Transportation Needs (1/8/2024)    [facility]    [facility] - Transportation     Lack of Transportation (Medical): No     Lack of Transportation (Non-Medical): No   Physical Activity: Not on file   Stress: Not on file   Social Connections: Not on file   Intimate Partner Violence: Not on file   Housing Stability: Low Risk  (1/8/2024)    [facility]    Housing Stability Vital Sign     Unable to Pay for Housing in the Last Year: No     Number of Places Lived in the Last Year: 1     Unstable Housing in the Last Year: No       FAMILY HISTORY:  No family history on file.    REVIEW OF SYSTEMS:  All systems reviewed, pertinent negatives as noted in HPI.     PHYSICAL EXAM:  Visit Vitals  /74   Pulse 75   Temp 36.6 °C (97.9 °F) (Temporal)     Constitutional: Well-developed and well-nourished. No distress.    Head: Normocephalic and atraumatic.    Neck: Normal range of motion.     Pulmonary/Chest: Effort normal. No respiratory distress.   Abdominal: Non-distended.  : See below.  Integumentary: No rash or lesions visualized.  Musculoskeletal: Normal range of motion.    Neurological: Alert and oriented.  Psychiatric: Normal mood and affect. Thought content normal.      LABORATORY REVIEW:   Lab Results   Component Value Date    BUN 20 02/17/2023    CREATININE 1.27 02/17/2023     02/17/2023    K 4.0 02/17/2023     (H) 02/17/2023    CO2 29 02/17/2023    OSMOLALITY 325 (H) 03/09/2018    CALCIUM 8.5 (L) 02/17/2023      Lab Results   Component Value Date    WBC 4.6 02/17/2023    RBC 4.26 (L) 02/17/2023    HGB 13.2 (L) 02/17/2023    HCT 42.2 02/17/2023    MCV 99 02/17/2023    MCHC 31.3 (L) 02/17/2023    RDW 13.2 02/17/2023     02/17/2023        Lab Results   Component Value Date    PSA 0.19 08/26/2022        Urine dipstick shows negative for all components.        Assessment:      1. Urinary urgency  Referral to Physical Therapy      2. Benign prostatic hyperplasia with urinary frequency  Post-Void Residual    tamsulosin (Flomax) 0.4 mg 24 hr capsule          Matthieu Birch is a 63 y.o. with BPH and associated urgency, frequency  Symptoms and urinary retention much improved since starting tamsulosin,  -> 61   May have degree of overlap in symptoms from [facility]     Plan:   Refer to pelvic floor PT  Continue tamsulosin 0.4 mg po  daily  RTC in 6-8 weeks for reassessment, consideration of medication  Encouraged to contact us in the interim with any questions, concerns

## 2024-10-15 ENCOUNTER — APPOINTMENT (OUTPATIENT)
Dept: UROLOGY | Facility: CLINIC | Age: 64
End: 2024-10-15
Payer: MEDICARE

## 2024-10-31 ENCOUNTER — APPOINTMENT (OUTPATIENT)
Dept: PRIMARY CARE | Facility: CLINIC | Age: 64
End: 2024-10-31
Payer: MEDICARE

## 2024-11-04 DIAGNOSIS — K21.9 GASTROESOPHAGEAL REFLUX DISEASE WITHOUT ESOPHAGITIS: ICD-10-CM

## 2024-11-04 RX ORDER — PANTOPRAZOLE SODIUM 40 MG/1
40 TABLET, DELAYED RELEASE ORAL EVERY MORNING
Qty: 90 TABLET | Refills: 0 | Status: SHIPPED | OUTPATIENT
Start: 2024-11-04

## 2024-11-13 ENCOUNTER — APPOINTMENT (OUTPATIENT)
Dept: UROLOGY | Facility: CLINIC | Age: 64
End: 2024-11-13
Payer: MEDICARE

## 2024-12-02 ENCOUNTER — APPOINTMENT (OUTPATIENT)
Dept: PRIMARY CARE | Facility: CLINIC | Age: 64
End: 2024-12-02
Payer: MEDICARE

## 2024-12-03 DIAGNOSIS — R35.0 BENIGN PROSTATIC HYPERPLASIA WITH URINARY FREQUENCY: ICD-10-CM

## 2024-12-03 DIAGNOSIS — N40.1 BENIGN PROSTATIC HYPERPLASIA WITH URINARY FREQUENCY: ICD-10-CM

## 2024-12-03 RX ORDER — TAMSULOSIN HYDROCHLORIDE 0.4 MG/1
0.4 CAPSULE ORAL DAILY
Qty: 90 CAPSULE | Refills: 3 | Status: SHIPPED | OUTPATIENT
Start: 2024-12-03 | End: 2025-11-28

## 2024-12-04 ENCOUNTER — APPOINTMENT (OUTPATIENT)
Dept: UROLOGY | Facility: CLINIC | Age: 64
End: 2024-12-04
Payer: MEDICARE

## 2024-12-09 ENCOUNTER — APPOINTMENT (OUTPATIENT)
Dept: PRIMARY CARE | Facility: CLINIC | Age: 64
End: 2024-12-09
Payer: MEDICARE

## 2024-12-11 ENCOUNTER — APPOINTMENT (OUTPATIENT)
Dept: UROLOGY | Facility: CLINIC | Age: 64
End: 2024-12-11
Payer: MEDICARE

## 2024-12-16 ENCOUNTER — APPOINTMENT (OUTPATIENT)
Dept: PRIMARY CARE | Facility: CLINIC | Age: 64
End: 2024-12-16
Payer: MEDICARE

## 2024-12-16 VITALS
SYSTOLIC BLOOD PRESSURE: 120 MMHG | DIASTOLIC BLOOD PRESSURE: 80 MMHG | HEART RATE: 77 BPM | BODY MASS INDEX: 25.62 KG/M2 | WEIGHT: 205 LBS | OXYGEN SATURATION: 96 %

## 2024-12-16 DIAGNOSIS — E87.6 HYPOKALEMIA: ICD-10-CM

## 2024-12-16 DIAGNOSIS — F10.21 ALCOHOL DEPENDENCE, IN REMISSION: ICD-10-CM

## 2024-12-16 DIAGNOSIS — R73.01 IFG (IMPAIRED FASTING GLUCOSE): Primary | ICD-10-CM

## 2024-12-16 DIAGNOSIS — Z23 IMMUNIZATION DUE: ICD-10-CM

## 2024-12-16 DIAGNOSIS — Z11.59 NEED FOR HEPATITIS C SCREENING TEST: ICD-10-CM

## 2024-12-16 DIAGNOSIS — M62.838 MUSCLE SPASM: ICD-10-CM

## 2024-12-16 DIAGNOSIS — N52.9 ERECTILE DYSFUNCTION, UNSPECIFIED ERECTILE DYSFUNCTION TYPE: ICD-10-CM

## 2024-12-16 PROCEDURE — 1036F TOBACCO NON-USER: CPT | Performed by: INTERNAL MEDICINE

## 2024-12-16 PROCEDURE — 99214 OFFICE O/P EST MOD 30 MIN: CPT | Performed by: INTERNAL MEDICINE

## 2024-12-16 PROCEDURE — G0008 ADMIN INFLUENZA VIRUS VAC: HCPCS | Performed by: INTERNAL MEDICINE

## 2024-12-16 PROCEDURE — 1157F ADVNC CARE PLAN IN RCRD: CPT | Performed by: INTERNAL MEDICINE

## 2024-12-16 PROCEDURE — 90662 IIV NO PRSV INCREASED AG IM: CPT | Performed by: INTERNAL MEDICINE

## 2024-12-16 PROCEDURE — 1159F MED LIST DOCD IN RCRD: CPT | Performed by: INTERNAL MEDICINE

## 2024-12-16 PROCEDURE — 1160F RVW MEDS BY RX/DR IN RCRD: CPT | Performed by: INTERNAL MEDICINE

## 2024-12-16 RX ORDER — TIZANIDINE 2 MG/1
2-4 TABLET ORAL EVERY 8 HOURS PRN
Qty: 60 TABLET | Refills: 0 | Status: SHIPPED | OUTPATIENT
Start: 2024-12-16

## 2024-12-16 RX ORDER — POTASSIUM CHLORIDE 20 MEQ/1
20 TABLET, EXTENDED RELEASE ORAL DAILY
Qty: 90 TABLET | Refills: 3 | Status: SHIPPED | OUTPATIENT
Start: 2024-12-16

## 2024-12-16 RX ORDER — SILDENAFIL 100 MG/1
100 TABLET, FILM COATED ORAL DAILY PRN
Qty: 6 TABLET | Refills: 5 | Status: SHIPPED | OUTPATIENT
Start: 2024-12-16

## 2024-12-16 NOTE — PROGRESS NOTES
Subjective   Patient ID: Matthieu Birch is a 64 y.o. male who presents for Follow-up (Patient here for follow-up visit).    HPI   The patient presents to the office for follow up, refill request.    Review of Systems  Right knee chronic pain   Objective   /80   Pulse 77   Wt 93 kg (205 lb)   SpO2 96%   BMI 25.62 kg/m²     Physical Exam  NAD. Cooperative.  Lungs CTA  Heart: RRR  LE: negative     Assessment/Plan   Diagnoses and all orders for this visit:  IFG (impaired fasting glucose)  -     Hemoglobin A1C; Future  Hypokalemia  -     potassium chloride CR (Klor-Con M20) 20 mEq ER tablet; Take 1 tablet (20 mEq) by mouth once daily. Do not crush or chew.  Muscle spasm  -     tiZANidine (Zanaflex) 2 mg tablet; Take 1-2 tablets (2-4 mg) by mouth every 8 hours if needed for muscle spasms.  Erectile dysfunction, unspecified erectile dysfunction type  -     sildenafil (Viagra) 100 mg tablet; Take 1 tablet (100 mg) by mouth once daily as needed for erectile dysfunction.  Need for hepatitis C screening test  -     Hepatitis C Antibody; Future  Immunization due  -     Flu vaccine, trivalent, preservative free, HIGH-DOSE, age 65y+ (Fluzone)  Alcohol dependence, in remission  Stable, in remission.

## 2025-01-19 DIAGNOSIS — K21.9 GASTROESOPHAGEAL REFLUX DISEASE WITHOUT ESOPHAGITIS: ICD-10-CM

## 2025-01-20 RX ORDER — PANTOPRAZOLE SODIUM 40 MG/1
40 TABLET, DELAYED RELEASE ORAL
Qty: 90 TABLET | Refills: 3 | Status: SHIPPED | OUTPATIENT
Start: 2025-01-20

## 2025-01-22 ENCOUNTER — APPOINTMENT (OUTPATIENT)
Dept: UROLOGY | Facility: CLINIC | Age: 65
End: 2025-01-22
Payer: MEDICARE

## 2025-01-29 DIAGNOSIS — M62.838 MUSCLE SPASM: ICD-10-CM

## 2025-01-30 RX ORDER — TIZANIDINE 2 MG/1
2-4 TABLET ORAL EVERY 8 HOURS PRN
Qty: 60 TABLET | Refills: 0 | Status: SHIPPED | OUTPATIENT
Start: 2025-01-30

## 2025-02-19 ENCOUNTER — APPOINTMENT (OUTPATIENT)
Dept: UROLOGY | Facility: CLINIC | Age: 65
End: 2025-02-19
Payer: COMMERCIAL

## 2025-03-03 DIAGNOSIS — M62.838 MUSCLE SPASM: ICD-10-CM

## 2025-03-03 DIAGNOSIS — R55 NEUROCARDIOGENIC PRE-SYNCOPE: ICD-10-CM

## 2025-03-03 RX ORDER — TOPIRAMATE 50 MG/1
50 TABLET, FILM COATED ORAL 2 TIMES DAILY
Qty: 180 TABLET | Refills: 0 | Status: SHIPPED | OUTPATIENT
Start: 2025-03-03

## 2025-03-03 RX ORDER — TIZANIDINE 2 MG/1
2-4 TABLET ORAL EVERY 8 HOURS PRN
Qty: 90 TABLET | Refills: 1 | Status: SHIPPED | OUTPATIENT
Start: 2025-03-03

## 2025-03-06 ENCOUNTER — APPOINTMENT (OUTPATIENT)
Dept: UROLOGY | Facility: CLINIC | Age: 65
End: 2025-03-06
Payer: COMMERCIAL

## 2025-03-06 VITALS — DIASTOLIC BLOOD PRESSURE: 81 MMHG | HEART RATE: 69 BPM | SYSTOLIC BLOOD PRESSURE: 131 MMHG

## 2025-03-06 DIAGNOSIS — R35.0 BENIGN PROSTATIC HYPERPLASIA WITH URINARY FREQUENCY: ICD-10-CM

## 2025-03-06 DIAGNOSIS — R39.15 URINARY URGENCY: Primary | ICD-10-CM

## 2025-03-06 DIAGNOSIS — N40.1 BENIGN PROSTATIC HYPERPLASIA WITH URINARY FREQUENCY: ICD-10-CM

## 2025-03-06 DIAGNOSIS — R32 URINARY INCONTINENCE, UNSPECIFIED TYPE: ICD-10-CM

## 2025-03-06 DIAGNOSIS — N32.81 OAB (OVERACTIVE BLADDER): ICD-10-CM

## 2025-03-06 LAB
POC APPEARANCE, URINE: ABNORMAL
POC BILIRUBIN, URINE: NEGATIVE
POC BLOOD, URINE: NEGATIVE
POC COLOR, URINE: ABNORMAL
POC GLUCOSE, URINE: NEGATIVE MG/DL
POC KETONES, URINE: NEGATIVE MG/DL
POC LEUKOCYTES, URINE: NEGATIVE
POC NITRITE,URINE: NEGATIVE
POC PH, URINE: 7.5 PH
POC PROTEIN, URINE: NEGATIVE MG/DL
POC SPECIFIC GRAVITY, URINE: 1.02
POC UROBILINOGEN, URINE: 0.2 EU/DL

## 2025-03-06 PROCEDURE — 81003 URINALYSIS AUTO W/O SCOPE: CPT | Performed by: NURSE PRACTITIONER

## 2025-03-06 PROCEDURE — 99214 OFFICE O/P EST MOD 30 MIN: CPT | Performed by: NURSE PRACTITIONER

## 2025-03-06 PROCEDURE — 1159F MED LIST DOCD IN RCRD: CPT | Performed by: NURSE PRACTITIONER

## 2025-03-06 PROCEDURE — G2211 COMPLEX E/M VISIT ADD ON: HCPCS | Performed by: NURSE PRACTITIONER

## 2025-03-06 PROCEDURE — 51798 US URINE CAPACITY MEASURE: CPT | Performed by: NURSE PRACTITIONER

## 2025-03-06 PROCEDURE — 1157F ADVNC CARE PLAN IN RCRD: CPT | Performed by: NURSE PRACTITIONER

## 2025-03-06 RX ORDER — MIDODRINE HYDROCHLORIDE 10 MG/1
10 TABLET ORAL
COMMUNITY

## 2025-03-06 RX ORDER — TAMSULOSIN HYDROCHLORIDE 0.4 MG/1
0.4 CAPSULE ORAL DAILY
Qty: 90 CAPSULE | Refills: 3 | Status: SHIPPED | OUTPATIENT
Start: 2025-03-06 | End: 2026-03-01

## 2025-03-06 NOTE — PROGRESS NOTES
UROLOGIC FOLLOW UP VISIT     PROBLEM LIST:  1. Urinary urgency  vibegron 75 mg tablet    Urine Culture      2. Benign prostatic hyperplasia with urinary frequency  POCT UA Automated manually resulted    Post-Void Residual      3. Urinary incontinence, unspecified type  vibegron 75 mg tablet    Urine Culture      4. OAB (overactive bladder)  vibegron 75 mg tablet           HISTORY OF PRESENT ILLNESS:   Matthieu Birch is a 64 y.o. with with BPH and associated urgency, frequency; significant urinary retention  Started on tamsulosin at visit 7/17/24    INTERVAL HISTORY:  Returns today for [facility]  Seen unaccompanied   No real appreciable change with PT  Continues to have significant frequency, up to twice an hour  Concerned about erectile dysfunction x 2-3 years  More difficulty maintaining erection  Less satisfaction with orgasm   Sildenafil 100 mg never that effective  Previously tried tadalafil without benefit    PSH multiple craniotomies, multiple back/neck surgeries [id number]  Spinal cord stimulator placed 2023    PAST MEDICAL HISTORY:  Past Medical History:   Diagnosis Date    Encounter for screening for malignant neoplasm of prostate 02/01/2019    Screening PSA (prostate specific antigen)    Epidural hemorrhage with loss of consciousness status unknown, initial encounter (Multi) 08/20/2018    Epidural hematoma    Old myocardial infarction     History of myocardial infarction    Personal history of other diseases of male genital organs 01/04/2021    History of benign prostatic hyperplasia    Personal history of other diseases of the circulatory system     History of cardiac disorder    Personal history of other diseases of the circulatory system 06/13/2018    History of subdural hematoma    Personal history of other diseases of the circulatory system     History of subdural hematoma    Personal history of other diseases of the circulatory system     History of cardiovascular disorder    Personal history of other  diseases of the musculoskeletal system and connective tissue     History of arthritis    Personal history of transient ischemic attack (TIA), and cerebral infarction without residual deficits     History of cerebrovascular accident    Traumatic subdural hemorrhage with loss of consciousness status unknown, initial encounter (Multi) 05/18/2018    Subdural hematoma, post-traumatic       PAST SURGICAL HISTORY:  Past Surgical History:   Procedure Laterality Date    ANKLE SURGERY  06/18/2018    Ankle Surgery    BACK SURGERY  05/05/2022    Back Surgery    MR HEAD ANGIO WO IV CONTRAST  1/28/2015    MR HEAD ANGIO WO IV CONTRAST 1/28/2015 [facility]    MR NECK ANGIO WO IV CONTRAST  1/28/2015    MR NECK ANGIO WO IV CONTRAST 1/28/2015 [facility]    NECK SURGERY  07/11/2017    Neck Surgery    OTHER SURGICAL HISTORY  03/26/2018    Craniotomy (Therapeutic)        ALLERGIES:   Allergies   Allergen Reactions    Oxycodone Confusion, Hallucinations and Psychosis    Oxycodone-Acetaminophen Hallucinations        MEDICATIONS:   Current Outpatient Medications on File Prior to Visit   Medication Sig Dispense Refill    amitriptyline (Elavil) 50 mg tablet Take 1 tablet (50 mg) by mouth once daily at bedtime. 90 tablet 3    aspirin 81 mg capsule Take 81 mg by mouth once every 24 hours.      atorvastatin (Lipitor) 80 mg tablet Take 1 tablet (80 mg) by mouth once daily. ----DUE FOR APPT 30 tablet 0    carbidopa-levodopa (Sinemet CR)  mg ER tablet Take 1 tablet by mouth once daily at bedtime.      clonazePAM (KlonoPIN) 1 mg tablet Take 3 tablets (3 mg) by mouth once daily at bedtime.      droxidopa (Northera) 100 mg capsule Take 5 capsules (500 mg) by mouth 3 times a day.      droxidopa (Northera) 300 mg capsule Take 1 capsule (300 mg) by mouth 3 times a day.      ferrous sulfate, 325 mg ferrous sulfate, tablet Take 1 tablet by mouth once daily.      fludrocortisone (Florinef) 0.1 mg tablet Take 1 tablet (0.1 mg) by  mouth once daily.      gabapentin (Neurontin) 300 mg capsule Take 1 capsule (300 mg) by mouth once daily at bedtime.      pantoprazole (ProtoNix) 40 mg EC tablet Take 1 tablet (40 mg) by mouth once daily in the morning. Take before meals. 90 tablet 3    potassium chloride CR (Klor-Con M20) 20 mEq ER tablet Take 1 tablet (20 mEq) by mouth once daily. Do not crush or chew. 90 tablet 3    rivastigmine (Exelon Patch) 9.5 mg/24 hour Place 1 patch on the skin once daily. 30 patch 11    sertraline (Zoloft) 100 mg tablet Take 1 tablet (100 mg) by mouth once daily. 30 tablet 5    sildenafil (Viagra) 100 mg tablet Take 1 tablet (100 mg) by mouth once daily as needed for erectile dysfunction. 6 tablet 5    tamsulosin (Flomax) 0.4 mg 24 hr capsule Take 1 capsule (0.4 mg) by mouth once daily. 90 capsule 3    tiZANidine (Zanaflex) 2 mg tablet Take 1-2 tablets (2-4 mg) by mouth every 8 hours if needed for muscle spasms. 90 tablet 1    topiramate (Topamax) 50 mg tablet Take 1 tablet (50 mg) by mouth 2 times a day. 180 tablet 0    [DISCONTINUED] tiZANidine (Zanaflex) 2 mg tablet Take 1-2 tablets (2-4 mg) by mouth every 8 hours if needed for muscle spasms. 60 tablet 0    [DISCONTINUED] topiramate (Topamax) 50 mg tablet Take 1 tablet (50 mg) by mouth 2 times a day. 180 tablet 0     No current facility-administered medications on file prior to visit.        SOCIAL HISTORY:  Patient  reports that he has never smoked. He has never used smokeless tobacco.   Social History     Socioeconomic History    Marital status:      Spouse name: Not on file    Number of children: Not on file    Years of education: Not on file    Highest education level: Not on file   Occupational History    Not on file   Tobacco Use    Smoking status: Never    Smokeless tobacco: Never   Substance and Sexual Activity    Alcohol use: Not on file    Drug use: Not on file    Sexual activity: Not on file   Other Topics Concern    Not on file   Social History  Narrative    Not on file     Social Drivers of Health     Financial Resource Strain: Not on file   Food Insecurity: Not on file   Transportation Needs: No Transportation Needs (1/8/2024)    [facility] [employer] [employer]    PRAPARE - Transportation     Lack of Transportation (Medical): No     Lack of Transportation (Non-Medical): No   Physical Activity: Not on file   Stress: Not on file   Social Connections: Not on file   Intimate Partner Violence: Not on file   Housing Stability: Low Risk  (1/8/2024)    Received from [employer]    Housing Stability Vital Sign     Unable to Pay for Housing in the Last Year: No     Number of Places Lived in the Last Year: 1     Unstable Housing in the Last Year: No       FAMILY HISTORY:  No family history on file.    REVIEW OF SYSTEMS:  All systems reviewed, pertinent negatives as noted in HPI.     PHYSICAL EXAM:  Visit Vitals  /81   Pulse 69     Constitutional: Well-developed and well-nourished. No distress.    Head: Normocephalic and atraumatic.    Neck: Normal range of motion.     Pulmonary/Chest: Effort normal. No respiratory distress.   Abdominal: Non-distended.  : See below.  Integumentary: No rash or lesions visualized.  Musculoskeletal: Normal range of motion.    Neurological: Alert and oriented.  Psychiatric: Normal mood and affect. Thought content normal.      LABORATORY REVIEW:   Lab Results   Component Value Date    BUN 20 02/17/2023    CREATININE 1.27 02/17/2023     02/17/2023    K 4.0 02/17/2023     (H) 02/17/2023    CO2 29 02/17/2023    OSMOLALITY 325 (H) 03/09/2018    CALCIUM 8.5 (L) 02/17/2023      Lab Results   Component Value Date    WBC 4.6 02/17/2023    RBC 4.26 (L) 02/17/2023    HGB 13.2 (L) 02/17/2023    HCT 42.2 02/17/2023    MCV 99 02/17/2023    MCHC 31.3 (L) 02/17/2023    RDW 13.2 02/17/2023     02/17/2023        Lab Results   Component Value Date    PSA 0.19 08/26/2022          Assessment:      1. Urinary  urgency  vibegron 75 mg tablet    Urine Culture      2. Benign prostatic hyperplasia with urinary frequency  POCT UA Automated manually resulted    Post-Void Residual      3. Urinary incontinence, unspecified type  vibegron 75 mg tablet    Urine Culture      4. OAB (overactive bladder)  vibegron 75 mg tablet            Matthieu Birch is a 64 y.o. with BPH and associated urgency, frequency  Urinary retention much improved since starting tamsulosin 7/17/24,  -> 61   Ongoing bothersome LUTS  No appreciable benefit with oxybutynin or pelvic floor PT  Also concerned about ED, minimal response to max dose of sildenafil   UA today cloudy, otherwise negative  PVR 54 mL    Discussed natural history of ED, most frequently an early manifestation of small vessel disease from chronic conditions such as HTN, DM  Given lack of response to PDE5i medications, consider either ICI or IPP  Agreeable to plan as below     Plan:   Urine for culture, will treat as indicated  Trial of vibegron 75 mg po daily; samples provided, discussed time to benefit and potential adverse effects  Continue tamsulosin 0.4 mg po daily  RTC in 6-8 weeks for reassessment,  Encouraged to contact us in the interim with any questions, concerns

## 2025-03-06 NOTE — PROGRESS NOTES
Cloudy dark urine with no indicators    Urge incontinence, slight incomplete voiding, frequency (2x/hr)

## 2025-03-08 LAB — BACTERIA UR CULT: NORMAL

## 2025-03-15 DIAGNOSIS — K21.9 GASTROESOPHAGEAL REFLUX DISEASE WITHOUT ESOPHAGITIS: ICD-10-CM

## 2025-03-17 RX ORDER — PANTOPRAZOLE SODIUM 40 MG/1
40 TABLET, DELAYED RELEASE ORAL EVERY MORNING
Qty: 90 TABLET | Refills: 2 | Status: SHIPPED | OUTPATIENT
Start: 2025-03-17

## 2025-04-14 ENCOUNTER — TELEPHONE (OUTPATIENT)
Dept: UROLOGY | Facility: CLINIC | Age: 65
End: 2025-04-14
Payer: COMMERCIAL

## 2025-04-14 ENCOUNTER — TELEPHONE (OUTPATIENT)
Age: 65
End: 2025-04-14
Payer: COMMERCIAL

## 2025-04-14 NOTE — TELEPHONE ENCOUNTER
Patient called in stating the samples he received are not doing the trick and requesting a call back from a clinical team member with another suggestion. Call back# [phone]

## 2025-04-19 DIAGNOSIS — M62.838 MUSCLE SPASM: ICD-10-CM

## 2025-04-23 RX ORDER — TIZANIDINE 2 MG/1
TABLET ORAL
Qty: 90 TABLET | Refills: 0 | Status: SHIPPED | OUTPATIENT
Start: 2025-04-23

## 2025-04-30 ENCOUNTER — APPOINTMENT (OUTPATIENT)
Age: 65
End: 2025-04-30
Payer: COMMERCIAL

## 2025-05-12 DIAGNOSIS — M62.838 MUSCLE SPASM: ICD-10-CM

## 2025-05-13 RX ORDER — TIZANIDINE 2 MG/1
TABLET ORAL
Qty: 90 TABLET | Refills: 0 | Status: SHIPPED | OUTPATIENT
Start: 2025-05-13

## 2025-05-17 DIAGNOSIS — M62.838 MUSCLE SPASM: ICD-10-CM

## 2025-05-18 NOTE — PROGRESS NOTES
Subjective   Patient ID: Matthieu Birch is a 64 y.o. male who presents for EVALUATION OF VOIDING DYSFUNCTION.  PT WAS ADOPTED SO HE DOES NOT KNOW HIS FAMILY HISTORY.  PT WAS NOT IN [facility]  HPI:  Are you experiencing:  Burning on urination -- NO  Pain on urination  -- NO  Urinary frequency -- EVERY 1-2 HOURS   Urinary urgency -- YES  Urge incontinence -- YES  Urinary stress incontinence  --  NO  Number of pads used per day -- NONE  Enuresis -- OCC  Nocturia-- 2 X ON AVG  Hematuria -- NO  Hesitancy -- YES  Post void fullness --  PT WILL OCC DOUBLE VOID   Strength of your stream-- WEAK    ROS:  General-- No C/O fever or chills  Head-- No C/O Dizziness  Eyes-- PT  C/O OCC double vision  Ears-- No C/O hearing loss  Neck-- Supple  Chest-- No C/O pain or discomfort  Lungs-- No C/O shortness of breath  Abdomen-- No C/O  pain or discomfort, No nausea or vomiting  Back-- No C/O back pain or discomfort  Extremities-- No C/O swelling or pain    OBJECTIVE  General-- well-developed, well-nourished in NAD  Head-- normal cephalic, atraumatic  Eyes-- PERRL, EOM'S FROM, no jaundice  Neck-- Supple, without masses  Chest-- Normal bony structure  Abdomen-- soft, non tender, liver spleen not palpable. No suprapubic masses.  Back-- no flank masses palpable, no CVA tenderness on palpation or percussion  Lymph nodes-- No inguinal lymphadenopathy noted  Prostate-- 1 1/2+, firm, smooth, non-tender, without nodules  Testis-- both down, non tender, without masses   Scrotum-- no hydrocele  Epididymis- no masses palpable  Extremities-- normal mass and strength for the pt's age  Neurological-- pt oriented x 3     PVR-- 66 ML    URINALYSIS DIPSTICK--  WNL    3-22-23  L-S SPINE FILMS:    FINDINGS:  Laminectomy lumbar spine with L3-L5 posterior fusion again noted  unchanged. No evidence of hardware complication. Moderate to advanced  upper lumbar degenerative changes are similar prior study.     Spinal stimulator terminating at the level of  T7-8      Impression   Status post laminectomy and lumbar fusion with a satisfactory  appearance unchanged.     Lumbar degenerative changes also similar to previous exam.       ASSESSMENT / PLAN  A:  VOIDING DYSFUNCTION ? SECONDARY TO HIS L-S DDD, PARKINSON'S DISEASE OR A COMBINATION OF BOTH  PT HAS A SPINAL STIMULATOR IN PLACE FOR PAIN MANAGEMENT   PT UNRESPONSIVE TO GEMTESA 75 MG / DAY  PATHPOPHYSIOLOGY OF THE ABOVE AND OPTIONS OF FURTHER THERAPY DISCUSSED IN DETAIL  ALL  QUESTIONS ANSWERED     H/O PARKINSON'S DISEASE  PT MAY HAVE EARLY  DEMENTIA   P:  WILL INCREASE THE GEMTESA  MG / DAY TO INCREASE BLADDER CAPACITY   CONTINUE :  FLOMAX 0.4 MG / DAY TO DECREASE BLADDER OUTLET RESISTANCE  F/U IN 3 WEEKS TO SEE HOW THE ABOVE IS WORKING   Chris Blackman MD 05/18/25 11:45 AM

## 2025-05-19 ENCOUNTER — APPOINTMENT (OUTPATIENT)
Age: 65
End: 2025-05-19
Payer: COMMERCIAL

## 2025-05-19 VITALS — SYSTOLIC BLOOD PRESSURE: 120 MMHG | HEART RATE: 65 BPM | DIASTOLIC BLOOD PRESSURE: 77 MMHG

## 2025-05-19 DIAGNOSIS — R39.12 WEAK URINARY STREAM: ICD-10-CM

## 2025-05-19 DIAGNOSIS — N39.41 URGENCY INCONTINENCE: ICD-10-CM

## 2025-05-19 DIAGNOSIS — R35.0 FREQUENCY OF MICTURITION: Primary | ICD-10-CM

## 2025-05-19 DIAGNOSIS — R39.11 HESITANCY OF MICTURITION: ICD-10-CM

## 2025-05-19 DIAGNOSIS — R35.0 BENIGN PROSTATIC HYPERPLASIA WITH URINARY FREQUENCY: ICD-10-CM

## 2025-05-19 DIAGNOSIS — R32 ENURESIS: ICD-10-CM

## 2025-05-19 DIAGNOSIS — R39.15 URGENCY OF MICTURITION: ICD-10-CM

## 2025-05-19 DIAGNOSIS — N40.1 BENIGN PROSTATIC HYPERPLASIA WITH URINARY FREQUENCY: ICD-10-CM

## 2025-05-19 DIAGNOSIS — N32.81 OAB (OVERACTIVE BLADDER): ICD-10-CM

## 2025-05-19 LAB
POC APPEARANCE, URINE: CLEAR
POC BILIRUBIN, URINE: NEGATIVE
POC BLOOD, URINE: NEGATIVE
POC COLOR, URINE: YELLOW
POC GLUCOSE, URINE: NEGATIVE MG/DL
POC KETONES, URINE: NEGATIVE MG/DL
POC LEUKOCYTES, URINE: NEGATIVE
POC NITRITE,URINE: NEGATIVE
POC PH, URINE: 7 PH
POC PROTEIN, URINE: NEGATIVE MG/DL
POC SPECIFIC GRAVITY, URINE: 1.02
POC UROBILINOGEN, URINE: 0.2 EU/DL

## 2025-05-19 PROCEDURE — 81003 URINALYSIS AUTO W/O SCOPE: CPT | Performed by: UROLOGY

## 2025-05-19 PROCEDURE — 1159F MED LIST DOCD IN RCRD: CPT | Performed by: UROLOGY

## 2025-05-19 PROCEDURE — 1036F TOBACCO NON-USER: CPT | Performed by: UROLOGY

## 2025-05-19 PROCEDURE — 1160F RVW MEDS BY RX/DR IN RCRD: CPT | Performed by: UROLOGY

## 2025-05-19 PROCEDURE — 99204 OFFICE O/P NEW MOD 45 MIN: CPT | Performed by: UROLOGY

## 2025-05-19 RX ORDER — VIBEGRON 75 MG/1
150 TABLET, FILM COATED ORAL DAILY
Qty: 180 TABLET | Refills: 3 | Status: SHIPPED | OUTPATIENT
Start: 2025-05-19 | End: 2026-05-19

## 2025-05-19 RX ORDER — RIVASTIGMINE 13.3 MG/24H
1 PATCH, EXTENDED RELEASE TRANSDERMAL
COMMUNITY
Start: 2025-04-07

## 2025-05-19 RX ORDER — TIZANIDINE 2 MG/1
TABLET ORAL
Qty: 90 TABLET | Refills: 0 | Status: SHIPPED | OUTPATIENT
Start: 2025-05-19

## 2025-05-19 RX ORDER — AMOXICILLIN AND CLAVULANATE POTASSIUM 875; 125 MG/1; MG/1
TABLET, FILM COATED ORAL
COMMUNITY
Start: 2025-05-19

## 2025-05-19 RX ORDER — DOXYCYCLINE HYCLATE 100 MG
TABLET ORAL
COMMUNITY
Start: 2025-05-12

## 2025-05-19 NOTE — LETTER
May 23, 2025     Lulu Mitchell MD  [address]  [facility] [address]    Patient: Matthieu Birch   YOB: 1960   Date of Visit: 5/19/2025       Dear Dr. Lulu Mitchell MD:    Thank you for referring Matthieu Birch to me for evaluation. Below are my notes for this consultation.  If you have questions, please do not hesitate to call me. I look forward to following your patient along with you.       Sincerely,     Chris Blackman MD      CC: No Recipients  ______________________________________________________________________________________    Subjective  Patient ID: Matthieu Birch is a 64 y.o. male who presents for EVALUATION OF VOIDING DYSFUNCTION.  PT WAS ADOPTED SO HE DOES NOT KNOW HIS FAMILY HISTORY.  PT WAS NOT IN [facility]  HPI:  Are you experiencing:  Burning on urination -- NO  Pain on urination  -- NO  Urinary frequency -- EVERY 1-2 HOURS   Urinary urgency -- YES  Urge incontinence -- YES  Urinary stress incontinence  --  NO  Number of pads used per day -- NONE  Enuresis -- OCC  Nocturia-- 2 X ON AVG  Hematuria -- NO  Hesitancy -- YES  Post void fullness --  PT WILL OCC DOUBLE VOID   Strength of your stream-- WEAK    ROS:  General-- No C/O fever or chills  Head-- No C/O Dizziness  Eyes-- PT  C/O OCC double vision  Ears-- No C/O hearing loss  Neck-- Supple  Chest-- No C/O pain or discomfort  Lungs-- No C/O shortness of breath  Abdomen-- No C/O  pain or discomfort, No nausea or vomiting  Back-- No C/O back pain or discomfort  Extremities-- No C/O swelling or pain    OBJECTIVE  General-- well-developed, well-nourished in NAD  Head-- normal cephalic, atraumatic  Eyes-- PERRL, EOM'S FROM, no jaundice  Neck-- Supple, without masses  Chest-- Normal bony structure  Abdomen-- soft, non tender, liver spleen not palpable. No suprapubic masses.  Back-- no flank masses palpable, no CVA tenderness on palpation or percussion  Lymph nodes-- No inguinal lymphadenopathy  noted  Prostate-- 1 1/2+, firm, smooth, non-tender, without nodules  Testis-- both down, non tender, without masses   Scrotum-- no hydrocele  Epididymis- no masses palpable  Extremities-- normal mass and strength for the pt's age  Neurological-- pt oriented x 3     PVR-- 66 ML    URINALYSIS DIPSTICK--  WNL    3-22-23  L-S SPINE FILMS:    FINDINGS:  Laminectomy lumbar spine with L3-L5 posterior fusion again noted  unchanged. No evidence of hardware complication. Moderate to advanced  upper lumbar degenerative changes are similar prior study.     Spinal stimulator terminating at the level of T7-8      Impression   Status post laminectomy and lumbar fusion with a satisfactory  appearance unchanged.     Lumbar degenerative changes also similar to previous exam.       ASSESSMENT / PLAN  A:  VOIDING DYSFUNCTION ? SECONDARY TO HIS L-S DDD, PARKINSON'S DISEASE OR A COMBINATION OF BOTH  PT HAS A SPINAL STIMULATOR IN PLACE FOR PAIN MANAGEMENT   PT UNRESPONSIVE TO GEMTESA 75 MG / DAY  PATHPOPHYSIOLOGY OF THE ABOVE AND OPTIONS OF FURTHER THERAPY DISCUSSED IN DETAIL  ALL  QUESTIONS ANSWERED     H/O PARKINSON'S DISEASE  PT MAY HAVE EARLY  DEMENTIA   P:  WILL INCREASE THE GEMTESA  MG / DAY TO INCREASE BLADDER CAPACITY   CONTINUE :  FLOMAX 0.4 MG / DAY TO DECREASE BLADDER OUTLET RESISTANCE  F/U IN 3 WEEKS TO SEE HOW THE ABOVE IS WORKING   Chris Blackman MD 05/18/25 11:45 AM

## 2025-05-22 DIAGNOSIS — M62.838 MUSCLE SPASM: ICD-10-CM

## 2025-05-22 DIAGNOSIS — R55 NEUROCARDIOGENIC PRE-SYNCOPE: ICD-10-CM

## 2025-05-26 RX ORDER — TOPIRAMATE 50 MG/1
50 TABLET, FILM COATED ORAL 2 TIMES DAILY
Qty: 180 TABLET | Refills: 0 | Status: SHIPPED | OUTPATIENT
Start: 2025-05-26

## 2025-05-26 RX ORDER — TIZANIDINE 2 MG/1
TABLET ORAL
Qty: 90 TABLET | Refills: 0 | Status: SHIPPED | OUTPATIENT
Start: 2025-05-26

## 2025-05-27 DIAGNOSIS — N40.1 BENIGN PROSTATIC HYPERPLASIA WITH URINARY FREQUENCY: ICD-10-CM

## 2025-05-27 DIAGNOSIS — N32.81 OAB (OVERACTIVE BLADDER): ICD-10-CM

## 2025-05-27 DIAGNOSIS — R35.0 BENIGN PROSTATIC HYPERPLASIA WITH URINARY FREQUENCY: ICD-10-CM

## 2025-05-27 RX ORDER — VIBEGRON 75 MG/1
150 TABLET, FILM COATED ORAL DAILY
Qty: 180 TABLET | Refills: 3 | Status: CANCELLED | OUTPATIENT
Start: 2025-05-27 | End: 2026-05-27

## 2025-05-29 DIAGNOSIS — N32.81 OAB (OVERACTIVE BLADDER): ICD-10-CM

## 2025-05-29 RX ORDER — VIBEGRON 75 MG/1
75 TABLET, FILM COATED ORAL 2 TIMES DAILY
Qty: 180 TABLET | Refills: 3 | Status: SHIPPED | OUTPATIENT
Start: 2025-05-29 | End: 2025-05-30 | Stop reason: SDUPTHER

## 2025-05-30 RX ORDER — VIBEGRON 75 MG/1
150 TABLET, FILM COATED ORAL DAILY
Qty: 180 TABLET | Refills: 3 | Status: SHIPPED | OUTPATIENT
Start: 2025-05-30 | End: 2026-05-30

## 2025-06-02 RX ORDER — MIRABEGRON 50 MG/1
50 TABLET, FILM COATED, EXTENDED RELEASE ORAL DAILY
Qty: 90 TABLET | Refills: 3 | Status: SHIPPED | OUTPATIENT
Start: 2025-06-02 | End: 2026-06-02

## 2025-06-09 ENCOUNTER — APPOINTMENT (OUTPATIENT)
Age: 65
End: 2025-06-09
Payer: MEDICARE

## 2025-06-10 ENCOUNTER — APPOINTMENT (OUTPATIENT)
Age: 65
End: 2025-06-10
Payer: COMMERCIAL

## 2025-06-15 NOTE — PROGRESS NOTES
Subjective   Patient ID: Matthieu Birch is a 64 y.o. male who presents for A F/U ON HIS VOIDING DYSFUNCTION AFTER STARTING MYRBETRIQ 50 MG / DAY ON  5-19-25.  HPI:  Are you experiencing:  Burning on urination -- NO  Pain on urination  -- NO  Urinary frequency -- EVERY 1-2 HOURS   Urinary urgency -- YES  Urge incontinence -- YES  Urinary stress incontinence  --  NO  Number of pads used per day -- NONE  Enuresis -- OCC  Nocturia-- 2 X ON AVG  Hematuria -- NO  Hesitancy -- YES  Post void fullness --  PT WILL OCC DOUBLE VOID   Strength of your stream-- WEAK    ASSESSMENT / PLAN  A:  VOIDING DYSFUNCTION ? SECONDARY TO HIS L-S DDD, PARKINSON'S DISEASE OR A COMBINATION OF BOTH  PT HAS A SPINAL STIMULATOR IN PLACE FOR PAIN MANAGEMENT   PT UNRESPONSIVE TO GEMTESA 75 MG / DAY  PATHPOPHYSIOLOGY OF THE ABOVE AND OPTIONS OF FURTHER THERAPY DISCUSSED IN DETAIL  ALL  QUESTIONS ANSWERED      H/O PARKINSON'S DISEASE  PT MAY HAVE EARLY  DEMENTIA   P:  WILL INCREASE THE GEMTESA  MG / DAY TO INCREASE BLADDER CAPACITY   CONTINUE :  FLOMAX 0.4 MG / DAY TO DECREASE BLADDER OUTLET RESISTANCE  F/U IN 3 WEEKS TO SEE HOW THE ABOVE IS WORKING   Chris Blackman MD 05/18/25 11:45 AM   ADDENDUM:  GEMTESA IS NOT COVERED BY HIS INSURANCE  WILL START HIM ON MYRBETRIQ 50 MG /DAY  F/U IN 3 WEEKS TO SEE HOW THE ABOVE IS WORKING  Chris Blackman MD 06/15/25 7:56 AM

## 2025-06-18 ENCOUNTER — APPOINTMENT (OUTPATIENT)
Age: 65
End: 2025-06-18
Payer: MEDICARE

## 2025-06-18 VITALS — TEMPERATURE: 98.8 F | HEART RATE: 81 BPM | DIASTOLIC BLOOD PRESSURE: 65 MMHG | SYSTOLIC BLOOD PRESSURE: 105 MMHG

## 2025-06-18 DIAGNOSIS — N40.1 BENIGN PROSTATIC HYPERPLASIA WITH URINARY FREQUENCY: ICD-10-CM

## 2025-06-18 DIAGNOSIS — R35.0 BENIGN PROSTATIC HYPERPLASIA WITH URINARY FREQUENCY: ICD-10-CM

## 2025-06-18 DIAGNOSIS — R35.1 NOCTURIA: ICD-10-CM

## 2025-06-18 DIAGNOSIS — R35.0 FREQUENCY OF MICTURITION: Primary | ICD-10-CM

## 2025-06-18 DIAGNOSIS — R39.12 WEAK URINARY STREAM: ICD-10-CM

## 2025-06-18 PROCEDURE — 51798 US URINE CAPACITY MEASURE: CPT | Performed by: UROLOGY

## 2025-06-18 PROCEDURE — 1160F RVW MEDS BY RX/DR IN RCRD: CPT | Performed by: UROLOGY

## 2025-06-18 PROCEDURE — 99212 OFFICE O/P EST SF 10 MIN: CPT | Performed by: UROLOGY

## 2025-06-18 PROCEDURE — 1159F MED LIST DOCD IN RCRD: CPT | Performed by: UROLOGY

## 2025-06-18 PROCEDURE — 1036F TOBACCO NON-USER: CPT | Performed by: UROLOGY

## 2025-06-18 RX ORDER — DROXIDOPA 200 MG/1
200 CAPSULE ORAL
COMMUNITY
Start: 2025-06-16

## 2025-06-18 RX ORDER — CARBIDOPA AND LEVODOPA 25; 100 MG/1; MG/1
TABLET ORAL
COMMUNITY
Start: 2025-05-30

## 2025-06-18 NOTE — LETTER
June 18, 2025     Lulu Mitchell MD  [address]  [facility] [address]    Patient: Matthieu Birch   YOB: 1960   Date of Visit: 6/18/2025       Dear Dr. Lulu Mitchell MD:    Thank you for referring Matthieu Birch to me for evaluation. Below are my notes for this consultation.  If you have questions, please do not hesitate to call me. I look forward to following your patient along with you.       Sincerely,     Chris Blackman MD      CC: No Recipients  ______________________________________________________________________________________    Subjective  Patient ID: Matthieu Birch is a 64 y.o. male who presents for A F/U ON HIS VOIDING DYSFUNCTION AFTER STARTING MYRBETRIQ 50 MG / DAY ON  6-2-25. WHILE ON MYRBETRIQ HE WAS HAVING MORE ENURESIS.  PT WAS ON THE MYRBETRIQ FOR ONLY ABOUT 4 DAYS  THEN IS WAS STOPPED .  HPI:  Are you experiencing:  Burning on urination -- NO  Pain on urination  -- NO  Urinary frequency -- EVERY 1-2 HOURS   Urinary urgency -- NO  Urge incontinence -- NO  Urinary stress incontinence  --  NO  Number of pads used per day -- NONE  Enuresis -- NO  Nocturia-- 2 X ON AVG  Hematuria -- NO  Hesitancy -- NO  Post void fullness --  PT WILL OCC DOUBLE VOID   Strength of your stream-- WEAK    PVR-- 115    ASSESSMENT / PLAN  A:  VOIDING DYSFUNCTION ? SECONDARY TO HIS L-S DDD, PARKINSON'S DISEASE OR A COMBINATION OF BOTH  PT HAS A SPINAL STIMULATOR IN PLACE FOR PAIN MANAGEMENT   PT UNRESPONSIVE TO GEMTESA 75 MG / DAY AND MYRBETRIQ 50 MG / DAY   CURRENT VOIDING SXS ARE TOLERABLE  PATHPOPHYSIOLOGY OF THE ABOVE AND OPTIONS OF FURTHER THERAPY RE-DISCUSSED IN DETAIL  ALL  QUESTIONS ANSWERED      H/O PARKINSON'S DISEASE  PT MAY HAVE EARLY  DEMENTIA   P:  WILL HOLD OFF ON TRYING ANYMORE OAB MEDS FOR NOW  PT IS NOT A CANDIDATE FOR ANTI CHOLINERGIC MEDS SECONDARY TO HIS MILD COGNITIVE DYSFUNCTION.  PT STATES HE  CAN LIVE WITH HIS CURRENT VOIDING SXS FOR NOW  CONTINUE  :  FLOMAX 0.4 MG / DAY TO DECREASE BLADDER OUTLET RESISTANCE  F/U IN ONE YEAR FOR A GLYNN AND PSA CK   Chris Blackman MD 05/18/25 11:45 AM

## 2025-07-01 ENCOUNTER — APPOINTMENT (OUTPATIENT)
Dept: PRIMARY CARE | Facility: CLINIC | Age: 65
End: 2025-07-01
Payer: COMMERCIAL

## 2025-07-10 ENCOUNTER — APPOINTMENT (OUTPATIENT)
Dept: UROLOGY | Facility: CLINIC | Age: 65
End: 2025-07-10
Payer: COMMERCIAL

## 2025-07-12 DIAGNOSIS — M62.838 MUSCLE SPASM: ICD-10-CM

## 2025-07-14 RX ORDER — TIZANIDINE 2 MG/1
TABLET ORAL
Qty: 90 TABLET | Refills: 23 | Status: SHIPPED | OUTPATIENT
Start: 2025-07-14

## 2025-08-10 DIAGNOSIS — R55 NEUROCARDIOGENIC PRE-SYNCOPE: ICD-10-CM

## 2025-08-11 RX ORDER — TOPIRAMATE 50 MG/1
50 TABLET, FILM COATED ORAL 2 TIMES DAILY
Qty: 180 TABLET | Refills: 0 | Status: SHIPPED | OUTPATIENT
Start: 2025-08-11

## 2026-06-22 ENCOUNTER — APPOINTMENT (OUTPATIENT)
Age: 66
End: 2026-06-22
Payer: MEDICARE